# Patient Record
(demographics unavailable — no encounter records)

---

## 2025-03-07 NOTE — HISTORY OF PRESENT ILLNESS
[TextBox_4] : P G LMP presents for confirmation of pregnancy visit.  IVF transfer on .  Taking prenatal vitamins

## 2025-03-10 NOTE — PHYSICAL EXAM
[Chaperone Present] : A chaperone was present in the examining room during all aspects of the physical examination [FreeTextEntry2] : Tg [Appropriately responsive] : appropriately responsive [Oriented x3] : oriented x3 [Labia Majora] : normal [Labia Minora] : normal [Normal] : normal [Uterine Adnexae] : normal

## 2025-03-10 NOTE — PLAN
[FreeTextEntry1] : PLAN: Patient welcomed and oriented to the practice. OB packet given reviewed by nurse, including contact numbers for the office. Reviewed appropriate nutritional advice, exercise, and sex in pregnancy. Common discomforts and relief measures discussed. Avoidance of toxins discussed including smoking, secondary smoke concerns, alcohol and environmental hazards. Pt aware of need for seat belts and travel recommendations/restrictions reviewed. Pt made aware of available services including other practitioners, classes and support groups.  MEDICATIONS: Prenatal vitamins, Folic Acid, DHA, Vitamin D3 supplement & magnesium discussed  TEACHING: Pt advised to call with fever above 101F, vaginal bleeding or severe cramping. Appropriate over -the-counter medications discussed but the patient was advised to call for persistent symptoms lasting longer than 48 hours. The patient is aware that use of ibuprophen and aspirin are both contraindicated in pregnancy.

## 2025-03-10 NOTE — PROCEDURE
[Transvaginal OB Sonogram] : Transvaginal OB Sonogram [Intrauterine Pregnancy] : intrauterine pregnancy [Yolk Sac] : yolk sac present [Fetal Heart] : fetal heart present [CRL: ___ (mm)] : CRL - [unfilled]Umm [Date: ___] : Date: [unfilled] [Current GA by Sonogram: ___ (wks)] : Current GA by Sonogram: [unfilled]Uwks [___ day(s)] : [unfilled] days [Transvaginal OB Sonogram WNL] : Transvaginal OB Sonogram WNL [FreeTextEntry1] : IUP at 8 weeks and 5 days Fetal heart rate 142 bpm

## 2025-03-10 NOTE — HISTORY OF PRESENT ILLNESS
[FreeTextEntry1] : 34-year-old  2 para 0-0-1-0 presents for confirmation of pregnancy.  Patient has been trying to conceive for 18 months and this pregnancy was due to IVF.  She her transfer date was 2025 with an estimated delivery date of 2025.   This is her second IVF transfer the first 1 ended in a chemical pregnancy. She is currently on prenatal pills and baby aspirin  Last Pap smear was 2022  LMP 2025   [Patient reported PAP Smear was normal] : Patient reported PAP Smear was normal [PapSmeardate] : 01/2022 [TextBox_31] : History of abnormal Pap in 2014